# Patient Record
(demographics unavailable — no encounter records)

---

## 2025-04-16 NOTE — PLAN
[FreeTextEntry1] : Pap and lab work collected UPT in office positive. Patient is unsure if she would like to proceed with Pregnancy. Patient is booked for 4/16/25 to discuss options with Dr. Roche.

## 2025-04-16 NOTE — PHYSICAL EXAM
[Chaperoned Physical Exam] : A chaperone was present in the examining room during all aspects of the physical examination. [MA] : MA [Appropriately responsive] : appropriately responsive [Alert] : alert [No Acute Distress] : no acute distress [Soft] : soft [Non-tender] : non-tender [Non-distended] : non-distended [No Lesions] : no lesions [No Mass] : no mass [Examination Of The Breasts] : a normal appearance [No Masses] : no breast masses were palpable [Labia Majora] : normal [Labia Minora] : normal [Normal] : normal [Uterine Adnexae] : normal [Tenderness] : nontender

## 2025-04-16 NOTE — HISTORY OF PRESENT ILLNESS
[Patient reported PAP Smear was normal] : Patient reported PAP Smear was normal [N] : Patient does not use contraception [Y] : Patient is sexually active [Normal Amount/Duration] :  normal amount and duration [Regular Cycle Intervals] : periods have been regular [Frequency: Q ___ days] : menstrual periods occur approximately every [unfilled] days [Currently Active] : currently active [Men] : men [No] : No [Patient would like to be screened for STIs] : Patient would like to be screened for STIs [PapSmeardate] : 2/26/2024 [TextBox_31] : HPV not detected. [LMPDate] : 3/2/2025 [MensesFreq] : 28-30 [MensesLength] : 5 [FreeTextEntry1] : 3/2/2025

## 2025-04-16 NOTE — PHYSICAL EXAM
[MA] : MA [Appropriately responsive] : appropriately responsive [Alert] : alert [No Acute Distress] : no acute distress [No Lymphadenopathy] : no lymphadenopathy [Regular Rate Rhythm] : regular rate rhythm [No Murmurs] : no murmurs [Clear to Auscultation B/L] : clear to auscultation bilaterally [Soft] : soft [Non-tender] : non-tender [Non-distended] : non-distended [No HSM] : No HSM [No Lesions] : no lesions [No Mass] : no mass [Oriented x3] : oriented x3 [Labia Majora] : normal [Labia Minora] : normal [Normal] : normal [Enlarged ___ wks] : enlarged [unfilled] ~Uweeks [Uterine Adnexae] : normal [FreeTextEntry2] : tearful [Tenderness] : nontender [Mass ___ cm] : no uterine mass was palpated [FreeTextEntry5] : l/c/p

## 2025-04-16 NOTE — PLAN
[FreeTextEntry1] : Patient  on two options. medical TOP with Miso & MIFI pills vs. D&C procedure Patient decided on D&C procedure along with a Mirena IUD insertion same day. Beta HCG and CBC drawn.

## 2025-04-16 NOTE — HISTORY OF PRESENT ILLNESS
[FreeTextEntry1] : Ms. Nova, Sarah 34yo presents for follow up ultrasound to discuss possible termination of pregnancy.  LMP:3/2/2025 6W2D  Ultrasound confirmed intrauterine pregnancy at 6w3d.